# Patient Record
Sex: MALE | Race: BLACK OR AFRICAN AMERICAN | NOT HISPANIC OR LATINO | Employment: OTHER | URBAN - METROPOLITAN AREA
[De-identification: names, ages, dates, MRNs, and addresses within clinical notes are randomized per-mention and may not be internally consistent; named-entity substitution may affect disease eponyms.]

---

## 2018-03-15 ENCOUNTER — PES CALL (OUTPATIENT)
Dept: ADMINISTRATIVE | Facility: CLINIC | Age: 83
End: 2018-03-15

## 2018-04-09 ENCOUNTER — PES CALL (OUTPATIENT)
Dept: ADMINISTRATIVE | Facility: CLINIC | Age: 83
End: 2018-04-09

## 2018-05-01 ENCOUNTER — OFFICE VISIT (OUTPATIENT)
Dept: INTERNAL MEDICINE | Facility: CLINIC | Age: 83
End: 2018-05-01
Payer: MEDICARE

## 2018-05-01 VITALS
DIASTOLIC BLOOD PRESSURE: 88 MMHG | RESPIRATION RATE: 16 BRPM | WEIGHT: 212.19 LBS | OXYGEN SATURATION: 99 % | TEMPERATURE: 98 F | BODY MASS INDEX: 31.43 KG/M2 | HEART RATE: 67 BPM | HEIGHT: 69 IN | SYSTOLIC BLOOD PRESSURE: 130 MMHG

## 2018-05-01 DIAGNOSIS — I10 ESSENTIAL HYPERTENSION: Chronic | ICD-10-CM

## 2018-05-01 DIAGNOSIS — G89.29 CHRONIC RIGHT FLANK PAIN: Chronic | ICD-10-CM

## 2018-05-01 DIAGNOSIS — M19.90 ARTHRITIS: ICD-10-CM

## 2018-05-01 DIAGNOSIS — N52.9 ERECTILE DYSFUNCTION, UNSPECIFIED ERECTILE DYSFUNCTION TYPE: ICD-10-CM

## 2018-05-01 DIAGNOSIS — M51.36 DDD (DEGENERATIVE DISC DISEASE), LUMBAR: ICD-10-CM

## 2018-05-01 DIAGNOSIS — G56.03 BILATERAL CARPAL TUNNEL SYNDROME: Primary | Chronic | ICD-10-CM

## 2018-05-01 DIAGNOSIS — R10.9 CHRONIC RIGHT FLANK PAIN: Chronic | ICD-10-CM

## 2018-05-01 DIAGNOSIS — I65.29 OCCLUSION OF CAROTID ARTERY, UNSPECIFIED LATERALITY: ICD-10-CM

## 2018-05-01 DIAGNOSIS — E66.9 OBESITY, CLASS I, BMI 30-34.9: ICD-10-CM

## 2018-05-01 DIAGNOSIS — I25.10 ASCVD (ARTERIOSCLEROTIC CARDIOVASCULAR DISEASE): ICD-10-CM

## 2018-05-01 DIAGNOSIS — N40.0 BENIGN PROSTATIC HYPERPLASIA WITHOUT LOWER URINARY TRACT SYMPTOMS: ICD-10-CM

## 2018-05-01 PROCEDURE — G0438 PPPS, INITIAL VISIT: HCPCS | Mod: S$GLB,,, | Performed by: PHYSICIAN ASSISTANT

## 2018-05-01 PROCEDURE — 3075F SYST BP GE 130 - 139MM HG: CPT | Mod: CPTII,S$GLB,, | Performed by: PHYSICIAN ASSISTANT

## 2018-05-01 PROCEDURE — 3079F DIAST BP 80-89 MM HG: CPT | Mod: CPTII,S$GLB,, | Performed by: PHYSICIAN ASSISTANT

## 2018-05-01 PROCEDURE — 99499 UNLISTED E&M SERVICE: CPT | Mod: S$GLB,,, | Performed by: PHYSICIAN ASSISTANT

## 2018-05-01 PROCEDURE — 99999 PR PBB SHADOW E&M-EST. PATIENT-LVL III: CPT | Mod: PBBFAC,,, | Performed by: PHYSICIAN ASSISTANT

## 2018-05-01 RX ORDER — MELOXICAM 15 MG/1
1 TABLET ORAL DAILY
COMMUNITY
Start: 2018-04-24

## 2018-05-01 RX ORDER — DICYCLOMINE HYDROCHLORIDE 20 MG/1
20 TABLET ORAL DAILY PRN
COMMUNITY

## 2018-05-01 NOTE — PROGRESS NOTES
"Eusebio Mederos presented for a  Medicare AWV and comprehensive Health Risk Assessment today. 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    He was called in by [facility] [place] to have his health risk assessment done and he has had this done a couple of times in the past.  He says he sees PCP Dr. Fermin at the [facility] in the internal medicine section.  He does not know the doctor's first name.  He is also complaining of chronic right flank pain that he's had now for 2-3 years and has been worked up by Dr. Arturo Apple M.D. and a couple of other doctors.  Tests have included urinalysis, CBC, Chem-20, abdominal and pelvic CT scan and also examination by gastroenterology down in [facility].  He did have colonoscopy done a couple years ago.       The following assessments were completed:  · Living Situation  · CAGE  · Depression Screening  · Timed Get Up and Go  · Whisper Test  · Cognitive Function Screening  · Nutrition Screening  · ADL Screening  · PAQ Screening    Vitals:    05/01/18 0935   BP: 130/88   BP Location: Right arm   Patient Position: Sitting   BP Method: Large (Manual)   Pulse: 67   Resp: 16   Temp: 97.7 °F (36.5 °C)   TempSrc: Oral   SpO2: 99%   Weight: 96.2 kg (212 lb 3.1 oz)   Height: 5' 9" (1.753 m)     Body mass index is 31.34 kg/m².  Physical Exam   Constitutional: He is oriented to person, place, and time. He appears well-developed and well-nourished.   He is ambulatory without any mechanical device assistance such as a cane or walker.  He is thinking about getting a cane in the near " future.  His posture and his gait are normal.   HENT:   Head: Normocephalic and atraumatic.   Right Ear: External ear normal.   Left Ear: External ear normal.   Nose: Nose normal.   Mouth/Throat: Oropharynx is clear and moist. No oropharyngeal exudate.   His hearing is normal.  He does not use hearing aids.   Eyes: Conjunctivae and EOM are normal. Pupils are equal, round, and reactive to light. No scleral icterus.   He doesn't wear any glasses or contacts.   Neck: Normal range of motion. Neck supple. No JVD present. No tracheal deviation present. No thyromegaly present.   Cardiovascular: Normal rate, regular rhythm, normal heart sounds and intact distal pulses.  Exam reveals no gallop and no friction rub.    No murmur heard.  Pulmonary/Chest: Effort normal and breath sounds normal. No respiratory distress. He has no wheezes. He has no rales. He exhibits no tenderness.   Abdominal: Soft. Bowel sounds are normal. He exhibits no mass. There is no tenderness. There is no rebound and no guarding.   Genitourinary:   Genitourinary Comments: This portion of the examination was not accomplished today.   Musculoskeletal: Normal range of motion. He exhibits no edema or tenderness.   He does have some problem with weakness in his  bilaterally and that's probably related to the carpal tunnel syndrome.  There is also some paresthesias of his right fingers due to the carpal tunnel.   Lymphadenopathy:     He has no cervical adenopathy.   Neurological: He is alert and oriented to person, place, and time. He has normal reflexes. He displays normal reflexes. No cranial nerve deficit. Coordination normal.   Skin: Skin is warm and dry. No rash noted. He is not diaphoretic. No erythema.   Psychiatric: He has a normal mood and affect. His behavior is normal. Judgment and thought content normal.   Nursing note and vitals reviewed.        Diagnoses and health risks identified today and associated recommendations/orders:    He does not  see any medical specialists for any medical problems.    1. Bilateral carpal tunnel syndrome  He is followed for this problem by his PCP.  His PCP is at the [facility] in the internal medicine department by the name of Dr. Zander M.D.    2. DDD (degenerative disc disease), lumbar  Check answer to #1.    3. Arthritis  Check answer to #1.    4. ASCVD (arteriosclerotic cardiovascular disease)  Check answer to #1.    5. Essential hypertension  Check answer to #1.    6. Occlusion of carotid artery, unspecified laterality  Check answer to #1.    7. Benign prostatic hyperplasia without lower urinary tract symptoms  Check answer to #1.    8. Erectile dysfunction, unspecified erectile dysfunction type  Check answer to #1.    9. Obesity, Class I, BMI 30-34.9  Check answer to #1.    10. Chronic right flank pain  Check answer to #1.      Provided Eusebio with a 5-10 year written screening schedule and personal prevention plan. Recommendations were developed using the USPSTF age appropriate recommendations. Education, counseling, and referrals were provided as needed. After Visit Summary printed and given to patient which includes a list of additional screenings\tests needed.    I checked on his health maintenance sheet here in the EMR and he is deficient and a few areas and needs to have them updated by his PCP.    Follow-up in about 1 year (around 5/1/2019).  I found nothing wrong with his examination today except for a little discomfort in his right lower back when he tilts toward the right.  Is not the same pain he's been getting in his right flank however.  Maykel Haque PA-C

## 2019-03-18 ENCOUNTER — PES CALL (OUTPATIENT)
Dept: ADMINISTRATIVE | Facility: CLINIC | Age: 84
End: 2019-03-18

## 2019-06-10 PROBLEM — I70.0 AORTIC ATHEROSCLEROSIS: Status: ACTIVE | Noted: 2019-06-10

## 2019-10-07 ENCOUNTER — PES CALL (OUTPATIENT)
Dept: ADMINISTRATIVE | Facility: CLINIC | Age: 84
End: 2019-10-07

## 2019-10-11 ENCOUNTER — PES CALL (OUTPATIENT)
Dept: ADMINISTRATIVE | Facility: CLINIC | Age: 84
End: 2019-10-11